# Patient Record
Sex: FEMALE | Race: WHITE
[De-identification: names, ages, dates, MRNs, and addresses within clinical notes are randomized per-mention and may not be internally consistent; named-entity substitution may affect disease eponyms.]

---

## 2017-02-19 NOTE — DIAGNOSTIC IMAGING REPORT
CHEST ONE VIEW PORTABLE



CLINICAL HISTORY: Pt c/o SOB dyspnea



COMPARISON STUDY:  No previous studies for comparison.



FINDINGS: The bones soft tissues and hemidiaphragms are normal. The

cardiomediastinal silhouette is normal. The lungs are clear. The pulmonary

vasculature is normal. 



IMPRESSION:  Negative chest. 









Electronically signed by:  Juan C Allison M.D.

2/19/2017 5:15 PM



Dictated Date/Time:  2/19/2017 5:15 PM

## 2017-02-19 NOTE — EMERGENCY ROOM VISIT NOTE
History


Report prepared by Sandrine:  Lilian Alegria


Under the Supervision of:  Dr. Fady Neal M.D.


First contact with patient:  16:01


Chief Complaint:  FLU LIKE SX


Stated Complaint:  CHILLS,SWEATS,HEAVYNESS IN CHEST,SHALLOW BREATHING





History of Present Illness


The patient is a 21 year old female who presents to the Emergency Room with 

complaints of worsening flu-like symptoms for the past 3 days. She has been 

experiencing fevers, cough, shortness of breath, and chest heaviness. She went 

to [facility] yesterday. She had a negative flu swab and was diagnosed with 

bronchitis and a sinus infection. She was prescribed a Z-Myles. She states that 

she has been taking that medication as prescribed. She has been taking as well. 

The patient states that today her cough, shortness of breath, and chest 

heaviness persisted. She called [facility] and they advised her to come to the 

ED for further evaluation. The patient has been using a Symbicort inhaler. She 

does not take any OCP. She denies chance of pregnancy. Her vaccinations are up 

to date. She did not get vaccinated for the flu this year.





   Source of History:  patient


   Onset:  3 days ago


   Position:  other (global)


   Quality:  other (flu-like symptoms)


   Timing:  worsening


   Modifying Factors (Relieving):  ibuprofen


   Associated Symptoms:  + SOB, + cough, + fevers





Review of Systems


See HPI for pertinent positives & negatives. A total of 10 systems reviewed and 

were otherwise negative.





Past Medical & Surgical


Medical Problems:


(1) Asthma


(2) Bronchitis


(3) Headache


(4) Otitis media








Family History





Cancer





Social History


Smoking Status:  Never Smoker


Marital Status:  single


Housing Status:  lives with roommate


Occupation Status:  [employer] student





Current/Historical Medications


Scheduled


Azithromycin (Zithromax Z-Myles), 1 PKT PO UD


Prednisone (Prednisone Tab), 0 PO DAILY





Scheduled PRN


Ibuprofen (Advil), 400 MG PO Q4H PRN for Pain





Allergies


Coded Allergies:  


     Amoxicillin (Verified  Allergy, Unknown, UNKN, 2/19/17)


     Clavulanic Acid (Verified  Allergy, Unknown, UNKN, 2/19/17)





Physical Exam


Vital Signs











  Date Time  Temp Pulse Resp B/P Pulse Ox O2 Delivery O2 Flow Rate FiO2


 


2/19/17 17:03  97      


 


2/19/17 15:36 38.0 106 18 149/86 100 Room Air  











Physical Exam





GENERAL: Patient is a healthy-appearing well-nourished 21 year old female. She 

appears very anxious in the room. 


HEAD: Normocephalic atraumatic


EYES: Ocular movements intact pupils equal and react to light


OROPHARYNX mucous membranes are moist no exudates present no erythema or edema 

present


NECK: Supple no nuchal rigidity


CHEST: Good equal expansion


LUNGS: Clear and equal to auscultation, appears to have a whoop-like cough. 


CARDIAC: Normal S1 and S2


ABDOMEN: Soft nontender no guarding


BACK: No CVA tenderness


EXTREMITIES: No pain upon palpation normal muscle strength in all groups no 

clubbing cyanosis or edema


NEURO: Patient is following commands is answering questions appropriately. 

Alert and oriented x3 Cranial Nerves 2-12 grossly intact





Medical Decision & Procedures


ER Provider


Diagnostic Interpretation:


Radiology results as stated below per my review and radiologist interpretation:





CHEST ONE VIEW PORTABLE





CLINICAL HISTORY: Pt c/o SOB dyspnea





COMPARISON STUDY:  No previous studies for comparison.





FINDINGS: The bones soft tissues and hemidiaphragms are normal. The


cardiomediastinal silhouette is normal. The lungs are clear. The pulmonary


vasculature is normal. 





IMPRESSION:  Negative chest. 














Electronically signed by:  Juan C Allison M.D.


2/19/2017 5:15 PM





Dictated Date/Time:  2/19/2017 5:15 PM





Laboratory Results


2/19/17 16:35








Red Blood Count 4.52, Mean Corpuscular Volume 87.8, Mean Corpuscular Hemoglobin 

29.6, Mean Corpuscular Hemoglobin Concent 33.8, Mean Platelet Volume 9.4, 

Neutrophils (%) (Auto) 63.4, Lymphocytes (%) (Auto) 21.4, Monocytes (%) (Auto) 

14.0, Eosinophils (%) (Auto) 0.7, Basophils (%) (Auto) 0.5, Neutrophils # (Auto

) 3.79, Lymphocytes # (Auto) 1.28, Monocytes # (Auto) 0.84, Eosinophils # (Auto

) 0.04, Basophils # (Auto) 0.03





2/19/17 16:35

















Test


  2/19/17


16:35 2/19/17


16:41 2/19/17


16:50 2/19/17


17:00


 


White Blood Count


  5.98 K/uL


(4.8-10.8) 


  


  


 


 


Red Blood Count


  4.52 M/uL


(4.2-5.4) 


  


  


 


 


Hemoglobin


  13.4 g/dL


(12.0-16.0) 


  


  


 


 


Hematocrit 39.7 % (37-47)    


 


Mean Corpuscular Volume


  87.8 fL


() 


  


  


 


 


Mean Corpuscular Hemoglobin


  29.6 pg


(25-34) 


  


  


 


 


Mean Corpuscular Hemoglobin


Concent 33.8 g/dl


(32-36) 


  


  


 


 


Platelet Count


  217 K/uL


(130-400) 


  


  


 


 


Mean Platelet Volume


  9.4 fL


(7.4-10.4) 


  


  


 


 


Neutrophils (%) (Auto) 63.4 %    


 


Lymphocytes (%) (Auto) 21.4 %    


 


Monocytes (%) (Auto) 14.0 %    


 


Eosinophils (%) (Auto) 0.7 %    


 


Basophils (%) (Auto) 0.5 %    


 


Neutrophils # (Auto)


  3.79 K/uL


(1.4-6.5) 


  


  


 


 


Lymphocytes # (Auto)


  1.28 K/uL


(1.2-3.4) 


  


  


 


 


Monocytes # (Auto)


  0.84 K/uL


(0.11-0.59) 


  


  


 


 


Eosinophils # (Auto)


  0.04 K/uL


(0-0.5) 


  


  


 


 


Basophils # (Auto)


  0.03 K/uL


(0-0.2) 


  


  


 


 


RDW Standard Deviation


  40.2 fL


(36.4-46.3) 


  


  


 


 


RDW Coefficient of Variation


  12.5 %


(11.5-14.5) 


  


  


 


 


Immature Granulocyte % (Auto) 0.0 %    


 


Immature Granulocyte # (Auto)


  0.00 K/uL


(0.00-0.02) 


  


  


 


 


Est Creatinine Clear Calc


Drug Dose 103.4 ml/min 


  


  


  


 


 


Estimated GFR (


American) 108.1 


  


  


  


 


 


Estimated GFR (Non-


American 93.3 


  


  


  


 


 


BUN/Creatinine Ratio 8.7 (10-20)    


 


Calcium Level


  9.2 mg/dl


(8.5-10.1) 


  


  


 


 


Total Bilirubin


  0.4 mg/dl


(0.2-1) 


  


  


 


 


Aspartate Amino Transf


(AST/SGOT) 18 U/L (15-37) 


  


  


  


 


 


Alanine Aminotransferase


(ALT/SGPT) 28 U/L (12-78) 


  


  


  


 


 


Alkaline Phosphatase


  50 U/L


() 


  


  


 


 


Total Protein


  8.1 gm/dl


(6.4-8.2) 


  


  


 


 


Albumin


  4.5 gm/dl


(3.4-5.0) 


  


  


 


 


Globulin


  3.6 gm/dl


(2.5-4.0) 


  


  


 


 


Albumin/Globulin Ratio 1.3 (0.9-2)    


 


Monoscreen NEG (NEG)    


 


Bedside Hemoglobin


  


  14.3 g/dl


(12.0-16.0) 


  


 


 


Bedside Hematocrit  42 % (37-47)   


 


Bedside Sodium


  


  140 mEq/L


(135-144) 


  


 


 


Bedside Potassium


  


  4.1 mEq/L


(3.3-5.0) 


  


 


 


Bedside Chloride


  


  100 mEq/L


(101-112) 


  


 


 


Bedside Total CO2


  


  23 mEq/l


(24-31) 


  


 


 


Anion Gap


  


  23.0 mmol/L


(16-25) 


  


 


 


Bedside Blood Urea Nitrogen  8 mg/dl (7-18)   


 


Bedside Creatinine  0.8 mg/dl   


 


Bedside Glucose (other)


  


  88 mg/dl


(70-99) 


  


 


 


Bedside Ionized Calcium (Yfn)  1.21 mmol/l   


 


Bedside D-Dimer


  


  


  352 ng/mlFEU


(0-450) 


 


 


Urine Color    YELLOW 


 


Urine Appearance    CLEAR (CLEAR) 


 


Urine pH    6.5 (4.5-7.5) 


 


Urine Specific Gravity


  


  


  


  1.012


(1.000-1.030)


 


Urine Protein    NEG (NEG) 


 


Urine Glucose (UA)    NEG (NEG) 


 


Urine Ketones    NEG (NEG) 


 


Urine Occult Blood    2+ (NEG) 


 


Urine Nitrite    NEG (NEG) 


 


Urine Bilirubin    NEG (NEG) 


 


Urine Urobilinogen    NEG (NEG) 


 


Urine Leukocyte Esterase    SMALL (NEG) 


 


Urine WBC (Auto)


  


  


  


  5-10 /hpf


(0-5)


 


Urine RBC (Auto)    0-4 /hpf (0-4) 


 


Urine Hyaline Casts (Auto)    1-5 /lpf (0-5) 


 


Urine Epithelial Cells (Auto)    >30 /lpf (0-5) 


 


Urine Bacteria (Auto)    NEG (NEG) 


 


Influenza Type A (RT-PCR)


  


  


  


  Neg for Influ


A (NEG)


 


Influenza Type B (RT-PCR)


  


  


  


  Neg for Influ


B (NEG)





Labs reviewed by ED physician.





Medications Administered











 Medications


  (Trade)  Dose


 Ordered  Sig/Los


 Route  Start Time


 Stop Time Status Last Admin


Dose Admin


 


 Acetaminophen


  (Tylenol Tab)  1,000 mg  NOW  STAT


 PO  2/19/17 16:22


 2/19/17 16:26 DC 2/19/17 16:51


1,000 MG


 


 Albuterol 2 puffs  2 puffs  NOW  ONCE


 INH  2/19/17 16:30


 2/19/17 16:31 DC 2/19/17 17:56


2 PUFFS


 


 Sodium Chloride


  (Nss 1000ml)  1,000 ml @ 


 999 mls/hr  Q1H1M STAT


 IV  2/19/17 16:22


 2/19/17 17:22 DC 2/19/17 16:50


999 MLS/HR


 


 Albuterol Sulfate


  (Ventolin 0.083%


 2.5MG/3ML Neb)  2.5 mg  NOW  STAT


 INH  2/19/17 16:22


 2/19/17 16:26 DC 2/19/17 16:51


2.5 MG


 


 Methylprednisolone


 Sodium Succinate


  (Solu-Medrol IV)  60 mg  NOW  STAT


 IV  2/19/17 17:20


 2/19/17 17:21 DC 2/19/17 18:04


60 MG


 


 Albuterol Sulfate


  (Ventolin 0.083%


 2.5MG/3ML Neb)  2.5 mg  NOW  STAT


 INH  2/19/17 17:20


 2/19/17 17:21 DC 2/19/17 18:04


2.5 MG











ECG


Indication:  SOB/dyspnea


Rate (beats per minute):  72


Rhythm:  sinus rhythm


Findings:  1st degree AV block, no acute ischemic change, no ectopy


Comparison ECG Date:  no prior available





ED Course


1601: Past medical records reviewed. The patient was evaluated in room C10. A 

complete history and physical examination was performed. 





1622: Albuterol sulfate 2.5 mg INH, NSS 1000 ml @ 999 mls/hr IV, Tylenol 1000 

mg PO





1630: Albuterol 2 puffs ING





1650: I reassessed the patient at this time. She is doing well. 





1717: The patient is resting comfortably. 





1720: Albuterol sulfate 2.5 mg INH, Solu-Medrol 60 mg IV 





1733: I reassessed the patient at this time. She is feeling better and resting 

comfortably. I discussed the results and treatment plan with the patient. I 

answered all pertaining questions that she had. She expressed understanding and 

verbalized agreement. The patient will be discharged home.





Medical Decision


Differential diagnosis:


Etiologies such as infections, reactive airway disease, pneumonia, pneumothorax

, COPD, CHF, cardiac ischemia, pulmonary embolism, musculoskeletal, 

gastrointestinal, as well as others were entertained.





This is a 21-year-old female who presents emergency department complaining of 

fever and flulike symptoms that has been ongoing for the past several days.  

The patient has not taken anything for the flu today therefore she was given 

Tylenol in the emergency department along with a breathing treatment.  Repeat 

examination revealed much improvement patient's symptoms.  I will note that the 

patient does not have an elevation in her white blood count.  She is negative 

for flu however on examination she does appear to have a whoop like cough.  

Based on this finding a pertussis screen was initiated.  The patient is already 

on azithromycin for pertussis.  I will place patient on prednisone along with 

an albuterol inhaler.  The patient was in agreement with the treatment plan.





Impression





 Primary Impression:  


 Acute bronchitis





Scribe Attestation


The scribe's documentation has been prepared under my direction and personally 

reviewed by me in its entirety. I confirm that the note above accurately 

reflects all work, treatment, procedures, and medical decision making performed 

by me.





Departure Information


Dispostion


Home / Self-Care





Prescriptions





Prednisone (Prednisone Tab) 20 Mg Tab


0 PO DAILY, #7 TAB


   2 TABS DAILY FOR 2 DAYS, THEN 1 TAB DAILY FOR 2 DAYS, THEN 1/2 TAB


   DAILY FOR 2 DAYS.


   Prov: Fady Neal MD         2/19/17





Referrals


No Doctor, Assigned (PCP)





Forms


HOME CARE DOCUMENTATION FORM,                                                 

               IMPORTANT VISIT INFORMATION, School Instructions,       


   Work Instructions





Patient Instructions


Bronchitis Acute, My [facility]





Additional Instructions





Use inhaler twice every 6 hours


Continue taking Azithromycin





Take 1000 mg Tylenol every 6 hours for fever


Take 600 mg Ibuprofen every 6 hours


Offset meds every 3 hours











Culture results are usually available in approx 48 hours








You have been examined and treated today on an emergency basis only. This is 

not a substitute for, or an effort to provide, complete comprehensive medical 

care. It is impossible to recognize and treat all injuries or illnesses in a 

single emergency department visit. It is therefore important that you follow up 

closely with [facility].  Call as soon as possible for an 

appointment.  





Thank you for your time and consideration.  I look forward to speaking with you 

again soon.  Please don't hesitate to call us if you have any questions.





Problem Qualifiers








 Primary Impression:  


 Acute bronchitis


 Bronchitis organism:  unspecified organism  Qualified Codes:  J20.9 - Acute 

bronchitis, unspecified

## 2018-04-10 ENCOUNTER — HOSPITAL ENCOUNTER (EMERGENCY)
Dept: HOSPITAL 45 - C.EDB | Age: 23
Discharge: HOME | End: 2018-04-10
Payer: COMMERCIAL

## 2018-04-10 VITALS
HEIGHT: 66.5 IN | HEIGHT: 66.5 IN | WEIGHT: 156.75 LBS | WEIGHT: 156.75 LBS | BODY MASS INDEX: 24.89 KG/M2 | BODY MASS INDEX: 24.89 KG/M2

## 2018-04-10 VITALS — TEMPERATURE: 97.88 F

## 2018-04-10 VITALS — HEART RATE: 66 BPM | SYSTOLIC BLOOD PRESSURE: 98 MMHG | OXYGEN SATURATION: 99 % | DIASTOLIC BLOOD PRESSURE: 60 MMHG

## 2018-04-10 DIAGNOSIS — Z88.0: ICD-10-CM

## 2018-04-10 DIAGNOSIS — J45.909: ICD-10-CM

## 2018-04-10 DIAGNOSIS — T31.0: ICD-10-CM
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
## 2018-04-10 DIAGNOSIS — T20.06XA: Primary | ICD-10-CM

## 2018-04-10 DIAGNOSIS — Z88.1: ICD-10-CM
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
